# Patient Record
Sex: FEMALE | Race: WHITE | Employment: UNEMPLOYED | URBAN - METROPOLITAN AREA
[De-identification: names, ages, dates, MRNs, and addresses within clinical notes are randomized per-mention and may not be internally consistent; named-entity substitution may affect disease eponyms.]

---

## 2019-04-30 VITALS
DIASTOLIC BLOOD PRESSURE: 70 MMHG | SYSTOLIC BLOOD PRESSURE: 128 MMHG | TEMPERATURE: 96.5 F | WEIGHT: 131 LBS | BODY MASS INDEX: 24.73 KG/M2 | OXYGEN SATURATION: 97 % | HEART RATE: 88 BPM | HEIGHT: 61 IN

## 2019-04-30 RX ORDER — VALACYCLOVIR HYDROCHLORIDE 500 MG/1
500 TABLET, FILM COATED ORAL DAILY
COMMUNITY
End: 2019-08-08 | Stop reason: SDUPTHER

## 2019-04-30 RX ORDER — CYCLOBENZAPRINE HCL 5 MG
5 TABLET ORAL 2 TIMES DAILY PRN
COMMUNITY
End: 2019-05-09

## 2019-04-30 RX ORDER — DULOXETIN HYDROCHLORIDE 60 MG/1
60 CAPSULE, DELAYED RELEASE ORAL DAILY
COMMUNITY
End: 2019-08-09 | Stop reason: SDUPTHER

## 2019-04-30 RX ORDER — BUPRENORPHINE AND NALOXONE 8; 2 MG/1; MG/1
1 FILM, SOLUBLE BUCCAL; SUBLINGUAL DAILY
COMMUNITY

## 2019-05-09 ENCOUNTER — OFFICE VISIT (OUTPATIENT)
Dept: PRIMARY CARE CLINIC | Age: 64
End: 2019-05-09
Payer: COMMERCIAL

## 2019-05-09 VITALS
HEART RATE: 78 BPM | SYSTOLIC BLOOD PRESSURE: 110 MMHG | OXYGEN SATURATION: 99 % | TEMPERATURE: 98.8 F | RESPIRATION RATE: 16 BRPM | BODY MASS INDEX: 24.92 KG/M2 | HEIGHT: 61 IN | WEIGHT: 132 LBS | DIASTOLIC BLOOD PRESSURE: 60 MMHG

## 2019-05-09 DIAGNOSIS — M54.2 CERVICALGIA: ICD-10-CM

## 2019-05-09 DIAGNOSIS — Z13.220 LIPID SCREENING: ICD-10-CM

## 2019-05-09 DIAGNOSIS — F34.1 DYSTHYMIA: ICD-10-CM

## 2019-05-09 DIAGNOSIS — Z79.891 LONG TERM (CURRENT) USE OF OPIATE ANALGESIC: ICD-10-CM

## 2019-05-09 DIAGNOSIS — J00 ACUTE NASOPHARYNGITIS: ICD-10-CM

## 2019-05-09 DIAGNOSIS — Z12.11 SCREENING FOR COLON CANCER: Primary | ICD-10-CM

## 2019-05-09 DIAGNOSIS — B00.52 HERPES KERATITIS: ICD-10-CM

## 2019-05-09 PROCEDURE — G8427 DOCREV CUR MEDS BY ELIG CLIN: HCPCS | Performed by: FAMILY MEDICINE

## 2019-05-09 PROCEDURE — 99214 OFFICE O/P EST MOD 30 MIN: CPT | Performed by: FAMILY MEDICINE

## 2019-05-09 PROCEDURE — 3017F COLORECTAL CA SCREEN DOC REV: CPT | Performed by: FAMILY MEDICINE

## 2019-05-09 PROCEDURE — G8420 CALC BMI NORM PARAMETERS: HCPCS | Performed by: FAMILY MEDICINE

## 2019-05-09 PROCEDURE — 1036F TOBACCO NON-USER: CPT | Performed by: FAMILY MEDICINE

## 2019-05-09 SDOH — HEALTH STABILITY: MENTAL HEALTH: HOW OFTEN DO YOU HAVE A DRINK CONTAINING ALCOHOL?: MONTHLY OR LESS

## 2019-05-09 ASSESSMENT — ENCOUNTER SYMPTOMS
VOMITING: 0
DIARRHEA: 0
ABDOMINAL PAIN: 0
WHEEZING: 0
CONSTIPATION: 0
NAUSEA: 0
SORE THROAT: 1
SHORTNESS OF BREATH: 0
RHINORRHEA: 1

## 2019-05-09 NOTE — PROGRESS NOTES
2019     IRAM Pascual (:  1955) is a 66 y.o. female, here for evaluation of the following medical concerns:    Neck Pain: Patient presents today for follow-up of her cervicalgia. Patient was seen in regards this issue approximately one month ago. Patient previously had soreness in her neck with radiation bilaterally into her shoulders. She also had some numbness and tingling in her hands. Since patient's last appointment she was referred to physical therapy and a chiropractor. Patient reports that she has been doing better. Herpes Keratitis:  Patient recently ran out of her valacyclovir 500 mg prophylaxis regards to this issue. Patient was set up to see a ophthalmologist to follow her for this issue which she has not yet done. 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 URI: Patient reports that roughly 3-4 days ago she started to develop rhinorrhea, congestion, cough, sore throat. This is continued to progress. Patient now reports laryngitis with loss of her voice. Patient denies any fever, chills, nausea, vomiting, chest pain, shortness of breath. HM:   OB/GYN: PAP smear performed. Needs to complete mammogram.   Colonoscopy: FIT    Review of Systems   Constitutional: Negative for chills and fever. HENT: Positive for congestion, rhinorrhea and sore throat. Respiratory: Negative for shortness of breath and wheezing. Cardiovascular: Negative for chest pain and leg swelling. Gastrointestinal: Negative for abdominal pain, constipation, diarrhea, nausea and vomiting. Skin: Negative for rash. Neurological: Negative for light-headedness and headaches. Past Medical History:   Diagnosis Date    Depression     ETOH abuse     Herpes keratitis     Long term (current) use of opiate analgesic     Thyroid disease        Prior to Visit Medications    Medication Sig Taking?  Authorizing Provider   valACYclovir (VALTREX) 500 MG tablet Take 500 mg by mouth daily Yes Historical Provider, MD   buprenorphine-naloxone (SUBOXONE) 8-2 MG FILM SL film Place 1 Film under the tongue daily. 1 and 1/2 daily Yes Historical Provider, MD   DULoxetine (CYMBALTA) 60 MG extended release capsule Take 60 mg by mouth daily take 2 capsules daily Yes Historical Provider, MD        Allergies   Allergen Reactions    No Known Allergies        Social History     Tobacco Use    Smoking status: Former Smoker     Last attempt to quit: 2000     Years since quittin.0    Smokeless tobacco: Never Used   Substance Use Topics    Alcohol use: Yes     Frequency: Monthly or less     Comment: drinks grain alcohol daily           Vitals:    19 1114   BP: 110/60   Pulse: 78   Resp: 16   Temp: 98.8 °F (37.1 °C)   SpO2: 99%   Weight: 132 lb (59.9 kg)   Height: 5' 1\" (1.549 m)     Estimated body mass index is 24.94 kg/m² as calculated from the following:    Height as of this encounter: 5' 1\" (1.549 m). Weight as of this encounter: 132 lb (59.9 kg). Physical Exam   Constitutional: She appears well-developed. HENT:   Head: Normocephalic. Right Ear: Tympanic membrane normal.   Left Ear: Tympanic membrane normal.   Nose: Mucosal edema and rhinorrhea present. Mouth/Throat: Posterior oropharyngeal erythema present. No oropharyngeal exudate or posterior oropharyngeal edema. Eyes: Pupils are equal, round, and reactive to light. Conjunctivae are normal.   Cardiovascular: Normal rate, regular rhythm and normal heart sounds. No murmur heard. Pulmonary/Chest: Effort normal and breath sounds normal. She has no wheezes. She has no rales. Abdominal: Soft. Bowel sounds are normal. There is no tenderness. Musculoskeletal:        Cervical back: She exhibits normal range of motion, no tenderness and no bony tenderness. No sig. Pain on palpation of the patient's paraspinal musculature of her cervical spine. Lymphadenopathy:     She has no cervical adenopathy.

## 2019-05-30 ENCOUNTER — TELEPHONE (OUTPATIENT)
Dept: PRIMARY CARE CLINIC | Age: 64
End: 2019-05-30

## 2019-05-30 ENCOUNTER — HOSPITAL ENCOUNTER (OUTPATIENT)
Age: 64
Discharge: HOME | End: 2019-06-01
Payer: COMMERCIAL

## 2019-05-30 DIAGNOSIS — Z13.220 LIPID SCREENING: ICD-10-CM

## 2019-05-30 LAB
CHOLESTEROL, TOTAL: 254 MG/DL (ref 0–199)
HDLC SERPL-MCNC: 56 MG/DL
LDL CHOLESTEROL CALCULATED: 149 MG/DL (ref 0–99)
TRIGL SERPL-MCNC: 243 MG/DL (ref 0–149)
VLDLC SERPL CALC-MCNC: 49 MG/DL

## 2019-05-30 PROCEDURE — 36415 COLL VENOUS BLD VENIPUNCTURE: CPT

## 2019-05-30 PROCEDURE — 80061 LIPID PANEL: CPT

## 2019-06-08 PROBLEM — Z13.220 LIPID SCREENING: Status: RESOLVED | Noted: 2019-05-09 | Resolved: 2019-06-08

## 2019-07-26 DIAGNOSIS — Z12.11 SCREENING FOR COLON CANCER: ICD-10-CM

## 2019-07-26 LAB
CONTROL: NORMAL
HEMOCCULT STL QL: NEGATIVE

## 2019-07-26 PROCEDURE — 82274 ASSAY TEST FOR BLOOD FECAL: CPT | Performed by: FAMILY MEDICINE

## 2019-08-08 RX ORDER — VALACYCLOVIR HYDROCHLORIDE 500 MG/1
500 TABLET, FILM COATED ORAL DAILY
Qty: 90 TABLET | Refills: 0 | Status: SHIPPED | OUTPATIENT
Start: 2019-08-08 | End: 2019-10-28 | Stop reason: SDUPTHER

## 2019-08-08 RX ORDER — DULOXETIN HYDROCHLORIDE 60 MG/1
60 CAPSULE, DELAYED RELEASE ORAL DAILY
Qty: 90 CAPSULE | Refills: 0 | OUTPATIENT
Start: 2019-08-08

## 2019-08-12 ENCOUNTER — TELEPHONE (OUTPATIENT)
Dept: PRIMARY CARE CLINIC | Age: 64
End: 2019-08-12

## 2019-08-12 RX ORDER — DULOXETIN HYDROCHLORIDE 60 MG/1
60 CAPSULE, DELAYED RELEASE ORAL DAILY
Qty: 30 CAPSULE | Refills: 3 | Status: SHIPPED | OUTPATIENT
Start: 2019-08-12 | End: 2019-11-14 | Stop reason: SDUPTHER

## 2019-08-12 NOTE — TELEPHONE ENCOUNTER
Patient called in very upset that we did not send in her Cymbalta when it was first requested I explained to patient that is was denied due to the fact that her Phycologist usually proscribes that medication for her. Per PT she states that during her last office visit with Dr. Serjio Celestin he agreed to start proscribing medication for her. PT recalled in on 08/09/2019 for RX refill. Per PT her Pharmacy [facility] gave her a emergency script  on 08/10/2019 so she would not be completely out of her medication we sent her medication refill in today 08/12/2019. Patient is no longer feeling ill. Called Pharmacy verified Cymbalta 60mg one PO bid.

## 2019-10-24 RX ORDER — CYCLOBENZAPRINE HCL 5 MG
5 TABLET ORAL
COMMUNITY
End: 2019-12-05 | Stop reason: ALTCHOICE

## 2019-10-28 RX ORDER — VALACYCLOVIR HYDROCHLORIDE 500 MG/1
500 TABLET, FILM COATED ORAL DAILY
Qty: 90 TABLET | Refills: 0 | Status: SHIPPED | OUTPATIENT
Start: 2019-10-28 | End: 2019-11-14 | Stop reason: SDUPTHER

## 2019-11-14 ENCOUNTER — OFFICE VISIT (OUTPATIENT)
Dept: PRIMARY CARE CLINIC | Age: 64
End: 2019-11-14
Payer: COMMERCIAL

## 2019-11-14 VITALS
DIASTOLIC BLOOD PRESSURE: 80 MMHG | RESPIRATION RATE: 16 BRPM | TEMPERATURE: 98.6 F | BODY MASS INDEX: 27 KG/M2 | OXYGEN SATURATION: 96 % | HEART RATE: 78 BPM | SYSTOLIC BLOOD PRESSURE: 120 MMHG | HEIGHT: 61 IN | WEIGHT: 143 LBS

## 2019-11-14 DIAGNOSIS — B00.52 HERPES KERATITIS: Primary | ICD-10-CM

## 2019-11-14 DIAGNOSIS — R73.03 PREDIABETES: ICD-10-CM

## 2019-11-14 DIAGNOSIS — E04.1 THYROID NODULE: ICD-10-CM

## 2019-11-14 DIAGNOSIS — F34.1 DYSTHYMIA: ICD-10-CM

## 2019-11-14 DIAGNOSIS — Z79.891 LONG TERM (CURRENT) USE OF OPIATE ANALGESIC: ICD-10-CM

## 2019-11-14 DIAGNOSIS — M54.2 CERVICALGIA: ICD-10-CM

## 2019-11-14 PROCEDURE — 3017F COLORECTAL CA SCREEN DOC REV: CPT | Performed by: FAMILY MEDICINE

## 2019-11-14 PROCEDURE — G8428 CUR MEDS NOT DOCUMENT: HCPCS | Performed by: FAMILY MEDICINE

## 2019-11-14 PROCEDURE — 1036F TOBACCO NON-USER: CPT | Performed by: FAMILY MEDICINE

## 2019-11-14 PROCEDURE — G8482 FLU IMMUNIZE ORDER/ADMIN: HCPCS | Performed by: FAMILY MEDICINE

## 2019-11-14 PROCEDURE — G8419 CALC BMI OUT NRM PARAM NOF/U: HCPCS | Performed by: FAMILY MEDICINE

## 2019-11-14 PROCEDURE — 99214 OFFICE O/P EST MOD 30 MIN: CPT | Performed by: FAMILY MEDICINE

## 2019-11-14 RX ORDER — VALACYCLOVIR HYDROCHLORIDE 500 MG/1
500 TABLET, FILM COATED ORAL DAILY
Qty: 90 TABLET | Refills: 2 | Status: SHIPPED
Start: 2019-11-14 | End: 2020-04-10 | Stop reason: SDUPTHER

## 2019-11-14 RX ORDER — DULOXETIN HYDROCHLORIDE 60 MG/1
60 CAPSULE, DELAYED RELEASE ORAL DAILY
Qty: 90 CAPSULE | Refills: 2 | Status: SHIPPED
Start: 2019-11-14 | End: 2020-04-10 | Stop reason: SDUPTHER

## 2019-11-14 RX ORDER — ALENDRONATE SODIUM 70 MG/1
70 TABLET ORAL
Refills: 0 | COMMUNITY
Start: 2019-11-10

## 2019-11-14 ASSESSMENT — ENCOUNTER SYMPTOMS
ABDOMINAL PAIN: 0
SORE THROAT: 0
SHORTNESS OF BREATH: 0
DIARRHEA: 0
NAUSEA: 0
RHINORRHEA: 0
WHEEZING: 0
VOMITING: 0
CONSTIPATION: 0

## 2019-11-19 ENCOUNTER — HOSPITAL ENCOUNTER (OUTPATIENT)
Age: 64
Discharge: HOME | End: 2019-11-21
Payer: COMMERCIAL

## 2019-11-19 DIAGNOSIS — F34.1 DYSTHYMIA: ICD-10-CM

## 2019-11-19 DIAGNOSIS — E04.1 THYROID NODULE: ICD-10-CM

## 2019-11-19 DIAGNOSIS — B00.52 HERPES KERATITIS: ICD-10-CM

## 2019-11-19 DIAGNOSIS — R73.03 PREDIABETES: ICD-10-CM

## 2019-11-19 PROCEDURE — 85027 COMPLETE CBC AUTOMATED: CPT

## 2019-11-19 PROCEDURE — 84443 ASSAY THYROID STIM HORMONE: CPT

## 2019-11-19 PROCEDURE — 80053 COMPREHEN METABOLIC PANEL: CPT

## 2019-11-19 PROCEDURE — 83036 HEMOGLOBIN GLYCOSYLATED A1C: CPT

## 2019-11-19 PROCEDURE — 36415 COLL VENOUS BLD VENIPUNCTURE: CPT

## 2019-11-20 LAB
ALBUMIN SERPL-MCNC: 4 G/DL (ref 3.5–5.2)
ALP BLD-CCNC: 58 U/L (ref 35–104)
ALT SERPL-CCNC: 12 U/L (ref 0–32)
ANION GAP SERPL CALCULATED.3IONS-SCNC: 12 MMOL/L (ref 7–16)
AST SERPL-CCNC: 16 U/L (ref 0–31)
BILIRUB SERPL-MCNC: 0.3 MG/DL (ref 0–1.2)
BUN BLDV-MCNC: 10 MG/DL (ref 8–23)
CALCIUM SERPL-MCNC: 9 MG/DL (ref 8.6–10.2)
CHLORIDE BLD-SCNC: 103 MMOL/L (ref 98–107)
CO2: 25 MMOL/L (ref 22–29)
CREAT SERPL-MCNC: 0.7 MG/DL (ref 0.5–1)
GFR AFRICAN AMERICAN: >60
GFR NON-AFRICAN AMERICAN: >60 ML/MIN/1.73
GLUCOSE BLD-MCNC: 89 MG/DL (ref 74–99)
HBA1C MFR BLD: 4.9 % (ref 4–5.6)
HCT VFR BLD CALC: 42.8 % (ref 34–48)
HEMOGLOBIN: 13.1 G/DL (ref 11.5–15.5)
MCH RBC QN AUTO: 31 PG (ref 26–35)
MCHC RBC AUTO-ENTMCNC: 30.6 % (ref 32–34.5)
MCV RBC AUTO: 101.2 FL (ref 80–99.9)
PDW BLD-RTO: 12.6 FL (ref 11.5–15)
PLATELET # BLD: 212 E9/L (ref 130–450)
PMV BLD AUTO: 9.4 FL (ref 7–12)
POTASSIUM SERPL-SCNC: 4.7 MMOL/L (ref 3.5–5)
RBC # BLD: 4.23 E12/L (ref 3.5–5.5)
SODIUM BLD-SCNC: 140 MMOL/L (ref 132–146)
TOTAL PROTEIN: 6.5 G/DL (ref 6.4–8.3)
TSH SERPL DL<=0.05 MIU/L-ACNC: 1.41 UIU/ML (ref 0.27–4.2)
WBC # BLD: 4.8 E9/L (ref 4.5–11.5)

## 2019-11-22 DIAGNOSIS — E04.1 LEFT THYROID NODULE: Primary | ICD-10-CM

## 2019-12-05 ENCOUNTER — OFFICE VISIT (OUTPATIENT)
Dept: FAMILY MEDICINE CLINIC | Age: 64
End: 2019-12-05
Payer: COMMERCIAL

## 2019-12-05 VITALS
BODY MASS INDEX: 27.75 KG/M2 | SYSTOLIC BLOOD PRESSURE: 120 MMHG | RESPIRATION RATE: 20 BRPM | WEIGHT: 147 LBS | HEIGHT: 61 IN | TEMPERATURE: 97.3 F | OXYGEN SATURATION: 99 % | DIASTOLIC BLOOD PRESSURE: 82 MMHG | HEART RATE: 91 BPM

## 2019-12-05 DIAGNOSIS — M54.2 CERVICALGIA: Primary | ICD-10-CM

## 2019-12-05 PROCEDURE — 96372 THER/PROPH/DIAG INJ SC/IM: CPT | Performed by: NURSE PRACTITIONER

## 2019-12-05 PROCEDURE — 99213 OFFICE O/P EST LOW 20 MIN: CPT | Performed by: NURSE PRACTITIONER

## 2019-12-05 RX ORDER — PREDNISONE 20 MG/1
TABLET ORAL
Qty: 18 TABLET | Refills: 0 | Status: SHIPPED | OUTPATIENT
Start: 2019-12-05 | End: 2019-12-14

## 2019-12-05 RX ORDER — METHYLPREDNISOLONE ACETATE 40 MG/ML
40 INJECTION, SUSPENSION INTRA-ARTICULAR; INTRALESIONAL; INTRAMUSCULAR; SOFT TISSUE ONCE
Status: COMPLETED | OUTPATIENT
Start: 2019-12-05 | End: 2019-12-05

## 2019-12-05 RX ADMIN — METHYLPREDNISOLONE ACETATE 40 MG: 40 INJECTION, SUSPENSION INTRA-ARTICULAR; INTRALESIONAL; INTRAMUSCULAR; SOFT TISSUE at 14:37

## 2019-12-05 ASSESSMENT — ENCOUNTER SYMPTOMS
CHEST TIGHTNESS: 0
VOMITING: 0
CONSTIPATION: 0
ABDOMINAL PAIN: 0
COUGH: 0
NAUSEA: 0
SHORTNESS OF BREATH: 0
WHEEZING: 0
DIARRHEA: 0

## 2019-12-10 ENCOUNTER — HOSPITAL ENCOUNTER (OUTPATIENT)
Dept: ULTRASOUND IMAGING | Age: 64
Discharge: HOME | End: 2019-12-12
Payer: COMMERCIAL

## 2019-12-10 ENCOUNTER — TELEPHONE (OUTPATIENT)
Dept: PRIMARY CARE CLINIC | Age: 64
End: 2019-12-10

## 2019-12-10 DIAGNOSIS — E04.1 LEFT THYROID NODULE: ICD-10-CM

## 2019-12-10 PROCEDURE — 88305 TISSUE EXAM BY PATHOLOGIST: CPT

## 2019-12-10 PROCEDURE — 10005 FNA BX W/US GDN 1ST LES: CPT

## 2019-12-10 PROCEDURE — 88173 CYTOPATH EVAL FNA REPORT: CPT

## 2019-12-10 PROCEDURE — 76942 ECHO GUIDE FOR BIOPSY: CPT

## 2019-12-10 PROCEDURE — 2500000003 HC RX 250 WO HCPCS: Performed by: RADIOLOGY

## 2019-12-10 RX ORDER — LIDOCAINE HYDROCHLORIDE 10 MG/ML
5 INJECTION, SOLUTION INFILTRATION; PERINEURAL ONCE
Status: COMPLETED | OUTPATIENT
Start: 2019-12-10 | End: 2019-12-10

## 2019-12-10 RX ADMIN — LIDOCAINE HYDROCHLORIDE 5 ML: 10 INJECTION, SOLUTION INFILTRATION; PERINEURAL at 09:07

## 2019-12-10 ASSESSMENT — PAIN SCALES - GENERAL: PAINLEVEL_OUTOF10: 0

## 2019-12-12 DIAGNOSIS — E04.1 LEFT THYROID NODULE: Primary | ICD-10-CM

## 2019-12-27 ENCOUNTER — EVALUATION (OUTPATIENT)
Dept: PHYSICAL THERAPY | Age: 64
End: 2019-12-27
Payer: COMMERCIAL

## 2019-12-27 DIAGNOSIS — M54.2 CERVICALGIA: Primary | ICD-10-CM

## 2019-12-27 PROCEDURE — 97110 THERAPEUTIC EXERCISES: CPT | Performed by: PHYSICAL THERAPIST

## 2019-12-27 PROCEDURE — 97161 PT EVAL LOW COMPLEX 20 MIN: CPT | Performed by: PHYSICAL THERAPIST

## 2020-01-03 ENCOUNTER — TREATMENT (OUTPATIENT)
Dept: PHYSICAL THERAPY | Age: 65
End: 2020-01-03
Payer: COMMERCIAL

## 2020-01-03 PROCEDURE — 97110 THERAPEUTIC EXERCISES: CPT | Performed by: PHYSICAL THERAPIST

## 2020-01-03 PROCEDURE — 97530 THERAPEUTIC ACTIVITIES: CPT | Performed by: PHYSICAL THERAPIST

## 2020-01-03 NOTE — PROGRESS NOTES
[address] [place] [facility]   Phone: [phone]   Fax: [phone]      Physical Therapy Daily Treatment Note    Date: 1/3/2020  Patient Name: Maya Xiong  : 1955   MRN: [id number]  DOInjury: N/A  DOSx: N/A   Referring Provider: Salvador Savage, APRN - CNP  [address] [facility], [address] [place] [address]     Medical Diagnosis:   M54.2 (ICD-10-CM) - Cervicalgia    Outcome Measure:  NDI     S: Pt reports 1/10 pain at rest and 5/10 bicep area with activity. O:  Time [id number]     Visit  Repeat outcome measure at mid point and end. Pain 1- 5/10      ROM      Modalities      MH  5 minutes  Neck/upper trap area NC   Ice            Manual                  Stretch      Upper trap stretch  5 x 10s   TE   Levator scapulae stretch 5 x 10s   TE   Cervical rotation 10 x 3s   TE         Exercise      UBE  or      Bike      ROWS: H 2 x 10 x 5s hold OTB  TA   ROWS: M 1 x 10 x 5s hold OTB TA   ROWS: L      Shoulder ER with scap retraction      Cross country ski      Shrugs      Shoulder Press with wand      Cervical isometrics            Shoulder flexion supine with wand 20 x 5s   TE   Shoulder chest press with wand 2 x 10  TE                           Shoulder extension 2 x 10  OTB  TE         A:  Tolerated well. Pt reports compliance with HEP and reports she feels they are helping her at home. Pt's neck stretches completed 2 x through during session. End of session, pt reports feels good.     P: Continue with rehab plan  Michael Messina, PT DPT, PT [id number]   Treatment Charges: Mins Units   Initial Evaluation     Re-Evaluation     Ther Exercise         TE 34 2   Manual Therapy     MT     Ther Activities        TA 15 1   Gait Training          GT     Neuro Re-education NR     Modalities     Non-Billable Service Time 5    Other     Total Time/Units 54 3

## 2020-01-06 ENCOUNTER — TREATMENT (OUTPATIENT)
Dept: PHYSICAL THERAPY | Age: 65
End: 2020-01-06
Payer: COMMERCIAL

## 2020-01-06 PROCEDURE — 97110 THERAPEUTIC EXERCISES: CPT | Performed by: PHYSICAL THERAPIST

## 2020-01-06 PROCEDURE — 97150 GROUP THERAPEUTIC PROCEDURES: CPT | Performed by: PHYSICAL THERAPIST

## 2020-01-06 NOTE — PROGRESS NOTES
[address] [facility]   Phone: [phone]   Fax: [phone]      Physical Therapy Daily Treatment Note    Date: 2020  Patient Name: Chani Serra  : 1955   MRN: [id number]  DOInjury: N/A  DOSx: N/A   Referring Provider: Kylah Young, APRN - CNP  [address] [facility] [address] [facility], [address] [facility]     Medical Diagnosis:   M54.2 (ICD-10-CM) - Cervicalgia    Outcome Measure:  NDI     S: Pt reports 1/10 pain. Pt reports upset due to dog passed away recently. O:  Time [id number]     Visit 3/12 Repeat outcome measure at mid point and end. Pain 1/10 neck and arm      ROM      Modalities      MH  5 minutes  Neck/upper trap area NC   Ice            Manual                  Stretch      Upper trap stretch  5 x 10s   TE   Levator scapulae stretch 5 x 10s   TE   Cervical rotation 10 x 3s   TE         Exercise      UBE  or      Bike      ROWS: H 2 x 10 x 5s hold OTB  TA   ROWS: M 1 x 10 x 5s hold OTB TA   ROWS: L                  IR 2 x 10 OTB    ER 2 x 10  OTB    Cross country ski      Shrugs      Shoulder Press with wand      Cervical isometrics            Shoulder flexion supine with wand 20 x 5s   TE   Shoulder chest press with wand 2 x 10  TE   sidelying shoulder abd 2 x 10  Chicken wing                      Shoulder extension 2 x 10  OTB  TE         A:  Tolerated well. Pt reports inconsistent with HEP recently due to dog passing away. Pt reports will be more compliant going forward. Pt reports min increase in pain with activity but no change in pain end of session. Will progress as tolerated.    P: Continue with rehab plan  Karson Jackson, PT DPT, PT [id number]   Treatment Charges: Mins Units   Initial Evaluation     Re-Evaluation     Ther Exercise         TE 15 1   Manual Therapy     MT     Ther Activities        TA     Gait Training          GT     Neuro Re-education NR     Modalities     Non-Billable Service Time 5    Other 20 1   Total Time/Units 40 2

## 2020-01-07 ENCOUNTER — HOSPITAL ENCOUNTER (OUTPATIENT)
Dept: ULTRASOUND IMAGING | Age: 65
Discharge: HOME | End: 2020-01-09
Payer: COMMERCIAL

## 2020-01-07 VITALS
SYSTOLIC BLOOD PRESSURE: 137 MMHG | DIASTOLIC BLOOD PRESSURE: 63 MMHG | BODY MASS INDEX: 27 KG/M2 | HEART RATE: 73 BPM | WEIGHT: 143 LBS | HEIGHT: 61 IN | RESPIRATION RATE: 16 BRPM | OXYGEN SATURATION: 96 %

## 2020-01-07 PROCEDURE — 10005 FNA BX W/US GDN 1ST LES: CPT

## 2020-01-07 PROCEDURE — 88173 CYTOPATH EVAL FNA REPORT: CPT

## 2020-01-07 PROCEDURE — 88305 TISSUE EXAM BY PATHOLOGIST: CPT

## 2020-01-07 RX ORDER — LIDOCAINE HYDROCHLORIDE 10 MG/ML
5 INJECTION, SOLUTION INFILTRATION; PERINEURAL ONCE
Status: DISCONTINUED | OUTPATIENT
Start: 2020-01-07 | End: 2020-01-10 | Stop reason: HOSPADM

## 2020-01-07 NOTE — BRIEF OP NOTE
Interventional Radiology Brief Postoperative Note    Maya Xiong  YOB: 1955  [id number]    Pre-operative Diagnosis: Left thyroid nodule    Post-operative Diagnosis: Left thyroid nodule    Procedure: Left thyroid nodule FNA biopsy    Drains: None    Anesthesia: Local    Surgeons/Assistants: Soumya Cox    Estimated Blood Loss (mL): 1    Complications: None    Specimens: FNA    Findings: See PACS.      Electronically signed by Taty Palm MD on 1/7/2020 at 10:00 AM

## 2020-01-13 ENCOUNTER — TREATMENT (OUTPATIENT)
Dept: PHYSICAL THERAPY | Age: 65
End: 2020-01-13
Payer: COMMERCIAL

## 2020-01-13 PROCEDURE — 97110 THERAPEUTIC EXERCISES: CPT | Performed by: PHYSICAL THERAPIST

## 2020-01-13 PROCEDURE — 97150 GROUP THERAPEUTIC PROCEDURES: CPT | Performed by: PHYSICAL THERAPIST

## 2020-01-13 NOTE — PROGRESS NOTES
[address] [facility]   Phone: [phone]   Fax: [phone]      Physical Therapy Daily Treatment Note    Date: 2020  Patient Name: Huey Dominguez  : 1955   MRN: [id number]  DOInjury: N/A  DOSx: N/A   Referring Provider: Yahaira Contreras, APRN - CNP  [address], [address]     Medical Diagnosis:   M54.2 (ICD-10-CM) - Cervicalgia    Outcome Measure:  NDI     S: Pt reports no pain just a little achey R arm. Tarah Shell:  Time 1005- 1102     Visit  Repeat outcome measure at mid point and end. Pain . 5/10 R arm ache     ROM      Modalities      MH  5 minutes  Neck/upper trap area NC   Ice            Manual                  Stretch      Upper trap stretch  5 x 10s   TE   Levator scapulae stretch 5 x 10s   TE   Cervical rotation 10 x 3s   TE               Sleeper stretch into IR 3 x 30s     IR towel stretch 3 x 30s     Posterior capsule stretch 3 x 30 s     Exercise      UBE  or      Bike      ROWS: H 2 x 10 x 5s hold OTB  TA   ROWS: M 1 x 10 x 5s hold OTB TA   ROWS: L                  IR 2 x 10 OTB TE   ER 2 x 10  OTB TE   Cross country ski      Shrugs      Shoulder Press with wand      Cervical isometrics      Wand shoulder abd  20 x 5s  TE   Shoulder flexion supine with wand 20 x 5s   TE   Shoulder chest press with wand 3 x 10  TE   sidelying shoulder abd  Chicken wing    Bicep curl  2 x 10  3#, 3 pos TE         Standing shoulder flexion to 90* X 10  OTB  TE   Shoulder extension  OTB  TE         A:  Tolerated well. Pt reports no pain end of session, just mm fatigue. Will progress as tolerated.    P: Continue with rehab plan  Ekaterina Goodwin, PT DPT, PT [id number]   Treatment Charges: Mins Units   Initial Evaluation     Re-Evaluation     Ther Exercise         TE 37 2   Manual Therapy     MT     Ther Activities        TA     Gait Training          GT     Neuro Re-education NR     Modalities     Non-Billable Service Time     Other group  20 1   Total

## 2020-01-16 ENCOUNTER — TREATMENT (OUTPATIENT)
Dept: PHYSICAL THERAPY | Age: 65
End: 2020-01-16
Payer: COMMERCIAL

## 2020-01-16 PROCEDURE — 97150 GROUP THERAPEUTIC PROCEDURES: CPT | Performed by: PHYSICAL THERAPIST

## 2020-01-16 PROCEDURE — 97110 THERAPEUTIC EXERCISES: CPT | Performed by: PHYSICAL THERAPIST
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
## 2020-01-21 ENCOUNTER — TREATMENT (OUTPATIENT)
Dept: PHYSICAL THERAPY | Age: 65
End: 2020-01-21
Payer: COMMERCIAL

## 2020-01-21 PROCEDURE — 97110 THERAPEUTIC EXERCISES: CPT | Performed by: PHYSICAL THERAPIST

## 2020-01-21 NOTE — PROGRESS NOTES
[address] [facility]   Phone: [phone]   Fax: [phone]      Physical Therapy Daily Treatment Note    Date: 2020  Patient Name: Noel Mendez  : 1955   MRN: [id number]  DOInjury: N/A  DOSx: N/A   Referring Provider: No referring provider defined for this encounter. Medical Diagnosis:   M54.2 (ICD-10-CM) - Cervicalgia    Outcome Measure:  NDI     S: Pt reports . 5 pain. Pt reports and demonstrates that she is now able to get her hand behind her back with minimal pain. Pt reports very happy with progress. O:  Time [id number]     Visit  Repeat outcome measure at mid point and end. Pain . 5/10     ROM      Modalities      MH  NC   Ice            Manual                  Stretch      Upper trap stretch  5 x 10s   TE   Levator scapulae stretch 5 x 10s   TE   Cervical rotation 10 x 3s   TE               Sleeper stretch into IR 3 x 30s     IR towel stretch 3 x 30s     Posterior capsule stretch 3 x 30 s     Exercise      UBE  or      Bike      ROWS: H 2 x 10 x 5s hold OTB  TA   ROWS: M 2 x 10 x 5s hold OTB TA   ROWS: L                  IR 2 x 10 OTB TE   ER 2 x 10  OTB TE   Cross country ski      Shrugs      Shoulder Press with wand      Cervical isometrics      Wand shoulder abd  20 x 5s  TE   Shoulder flexion supine with wand 20 x 5s   TE   Shoulder chest press with wand 3 x 10  TE   sidelying shoulder abd 2 x 10      Bicep curl  2 x 10  2#, 3 pos TE         Standing shoulder flexion to 90* X 20  AROM  TE   Shoulder extension 2 x 10  OTB  TE         A:  Tolerated well. Pt reports 2-3/10 pain end of session from standing shoulder flexion. Will continue to monitor. Will progress as tolerated.    P: Continue with rehab plan  Racheal Gardner, PT DPT, PT [id number]   Treatment Charges: Mins Units   Initial Evaluation     Re-Evaluation     Ther Exercise         TE 44 3   Manual Therapy     MT     Ther Activities        TA 5 0   Gait Training          GT     Neuro

## 2020-01-23 ENCOUNTER — TREATMENT (OUTPATIENT)
Dept: PHYSICAL THERAPY | Age: 65
End: 2020-01-23
Payer: COMMERCIAL

## 2020-01-23 PROCEDURE — 97150 GROUP THERAPEUTIC PROCEDURES: CPT | Performed by: PHYSICAL THERAPIST

## 2020-01-23 PROCEDURE — 97110 THERAPEUTIC EXERCISES: CPT | Performed by: PHYSICAL THERAPIST

## 2020-01-23 NOTE — PROGRESS NOTES
Neuro Re-education NR     Modalities     Non-Billable Service Time     Other group  25 1   Total Time/Units 51 3

## 2020-01-27 ENCOUNTER — TREATMENT (OUTPATIENT)
Dept: PHYSICAL THERAPY | Age: 65
End: 2020-01-27
Payer: COMMERCIAL

## 2020-01-27 PROCEDURE — 97110 THERAPEUTIC EXERCISES: CPT | Performed by: PHYSICAL THERAPIST

## 2020-01-27 PROCEDURE — 97150 GROUP THERAPEUTIC PROCEDURES: CPT | Performed by: PHYSICAL THERAPIST

## 2020-01-27 NOTE — PROGRESS NOTES
[address] [facility]   Phone: [phone]   Fax: [phone]      Physical Therapy Daily Treatment Note    Date: 2020  Patient Name: Colleen Beckman  : 1955   MRN: [id number]  DOInjury: N/A  DOSx: N/A   Referring Provider: ABIEL Meza - CNP  [address] [facility] [address] [place], [address]     Medical Diagnosis:   M54.2 (ICD-10-CM) - Cervicalgia    Outcome Measure:  NDI     S: Pt reports 1/10 pain. Pt reports moved arm funny this weekend and had some pain but feeling better today. O:  Time 5287-1496     Visit  Repeat outcome measure at mid point and end. Pain 1/10     ROM      Modalities      MH  NC   Ice            Manual                  Stretch      Upper trap stretch  5 x 10s   TE   Levator scapulae stretch 5 x 10s   TE   Cervical rotation 10 x 3s   TE               Sleeper stretch into IR 3 x 30s     IR towel stretch 3 x 30s     Posterior capsule stretch 3 x 30 s     Exercise      UBE  or      Bike      ROWS: H 2 x 10 x 5s hold yTB  TA   ROWS: M 2 x 10 x 5s hold YTB TA   ROWS: L                  IR 2 x 10 YTB TE   ER 2 x 10  OTB TE   Cross country ski      Alphabet with ball on wall  X 1     Shoulder Press with wand      Cervical isometrics      Wand shoulder abd  20 x 5s  TE   Shoulder flexion supine with wand 20 x 5s   TE   Shoulder chest press with wand 3 x 10  TE   sidelying shoulder abd 2 x 10      Bicep curl  2 x 10  2#, 3 pos TE         Standing shoulder flexion and scaption  X 20  AROM and with OTB  TE   Shoulder extension 2 x 10  OTB  TE         A:  Tolerated well. Standing shoulder flexion and scaption OTB added as pt demonstrates increased ability with AROM. Pt with good tolerance to added TB.     P: Continue with rehab plan. Plan to advance exercises as tolerated next session.   Laury No, PT DPT, [address] [facility] [id number]   Treatment Charges: Mins Units   Initial Evaluation     Re-Evaluation     Ther Exercise         TE 27 2   Manual Therapy MT     Ther Activities        TA     Gait Training          GT     Neuro Re-education NR     Modalities     Non-Billable Service Time     Other group  27 1   Total Time/Units 54 3

## 2020-01-30 ENCOUNTER — TREATMENT (OUTPATIENT)
Dept: PHYSICAL THERAPY | Age: 65
End: 2020-01-30
Payer: COMMERCIAL

## 2020-01-30 PROCEDURE — 97110 THERAPEUTIC EXERCISES: CPT | Performed by: PHYSICAL THERAPIST

## 2020-01-30 NOTE — PROGRESS NOTES
Ther Exercise         TE 23 2   Manual Therapy     MT     Ther Activities        TA     Gait Training          GT     Neuro Re-education NR     Modalities     Non-Billable Service Time 18    Other group      Total Time/Units 41 2

## 2020-02-04 ENCOUNTER — TREATMENT (OUTPATIENT)
Dept: PHYSICAL THERAPY | Age: 65
End: 2020-02-04
Payer: COMMERCIAL

## 2020-02-04 PROCEDURE — 97530 THERAPEUTIC ACTIVITIES: CPT | Performed by: PHYSICAL THERAPIST

## 2020-02-04 PROCEDURE — 97110 THERAPEUTIC EXERCISES: CPT | Performed by: PHYSICAL THERAPIST

## 2020-02-04 NOTE — PROGRESS NOTES
[address] [facility]   Phone: [phone]   Fax: [phone]      Physical Therapy Daily Treatment Note    Date: 2020  Patient Name: Stephany Victor  : 1955   MRN: [id number]  DOInjury: N/A  DOSx: N/A   Referring Provider: Elan Singh, APRN - CNP  [address] [facility]  [address]  [place], [address]      Medical Diagnosis:   M54.2 (ICD-10-CM) - Cervicalgia    Outcome Measure:  NDI     S: Pt reports . 5/10 pain. Pt reports it's more a tightness than anything. Pt reports washing and combing her hair is getting easier along with taking coat off and on. Pt notices putting fitted sheet on is difficult still. O:  Time 1010- 1054     Visit 10/12 Repeat outcome measure at mid point and end. Pain . 5/10     ROM      Modalities        NC   Ice            Manual                  Stretch      Upper trap stretch  5 x 10s   TE   Levator scapulae stretch 5 x 10s   TE   Cervical rotation 10 x 3s   TE               Sleeper stretch into IR 3 x 30s     IR towel stretch 3 x 30s     Posterior capsule stretch 3 x 30 s     Exercise      UBE  or      Bike      ROWS: H 3 x 10 x 5s hold yTB  TA   ROWS: M 3 x 10 x 5s hold YTB TA   ROWS: L                  IR 3 x 10 YTB TE   ER 3 x 10  YTB TE   Cross country ski      Alphabet with ball on wall  X 2     Shoulder Press with wand      Wall push ups with SB 2 x 10     Shoulder flexion stretch on wall with SB 10 x 10s TE   Shoulder flexion supine with wand  TE   Shoulder chest press with wand   TE   sidelying shoulder abd      Bicep curl  2 x 10  3#, 3 pos TE   PNF  2 x 10  All 4 patterns, YTB    Standing shoulder flexion and scaption  X 20   YTB  TE   Shoulder extension 3 x 10  YTB  TE         A:  Tolerated well. Weights , theraband and reps advanced today with good tolerances. PNF patterns also added today. Pt reports 1/10 pain end of session. P: Continue with rehab plan.    Sneha Live, PT [facility] [place] [id number]   Treatment Charges: Mins Units

## 2020-02-06 ENCOUNTER — TREATMENT (OUTPATIENT)
Dept: PHYSICAL THERAPY | Age: 65
End: 2020-02-06
Payer: COMMERCIAL

## 2020-02-06 PROCEDURE — 97530 THERAPEUTIC ACTIVITIES: CPT | Performed by: PHYSICAL THERAPIST

## 2020-02-06 PROCEDURE — 97110 THERAPEUTIC EXERCISES: CPT | Performed by: PHYSICAL THERAPIST

## 2020-02-06 NOTE — PROGRESS NOTES
[address] [facility]   Phone: [phone]             Fax: [phone]        Physical Therapy Daily Treatment Note     Date: 2020  Patient Name: Neema Andersen  : 1955            MRN: [id number]  DOInjury: N/A  DOSx: N/A   Referring Provider: Bobie Oppenheim, APRN - CNP  [address] [facility] [address]  [place], [facility] [address] [facility]                                 Medical Diagnosis:   M54.2 (ICD-10-CM) - Cervicalgia     Outcome Measure:  NDI      S: Pt reports difficulty sleeping last night but not due to pain. Pt reports . 5/10 pain. O:  Time 1006- 1059       Visit  Repeat outcome measure at mid point and end.     Pain . 5/10       ROM         Modalities         MH    NC   Ice                   Manual                             Stretch         Upper trap stretch  5 x 10s    TE   Levator scapulae stretch 5 x 10s    TE   Cervical rotation 10 x 3s    TE                       Sleeper stretch into IR 3 x 30s       IR towel stretch 3 x 30s       Posterior capsule stretch 3 x 30 s       Exercise         UBE  or         Bike         ROWS: H 3 x 10 x 5s hold yTB  TA   ROWS: M 3 x 10 x 5s hold YTB TA   ROWS: L                             IR 3 x 10 YTB TE   ER 3 x 10  YTB TE   Cross country ski         Alphabet with ball on wall  X 2       Shoulder Press with wand         Wall push ups with SB 2 x 10       Shoulder flexion stretch on wall with SB 10 x 10s   TE   Shoulder flexion supine with wand    TE   Shoulder chest press with wand    TE   sidelying shoulder abd        Bicep curl  3 x 10  4#, 3 pos TE   PNF  2 x 10  All 4 patterns, YTB     Standing shoulder flexion and scaption  X 20   YTB  TE   Shoulder extension 3 x 10  YTB  TE    tower taps   3 x 10   3#      A:  Tolerated well. [place]rum taps added today. Pt reports no problem with. Pt reports easy to put jacket on now (no shoulder pain with ). Still has difficulty putting purse on seat next to her when sitting in car.  PNF exercises to simulate functional movements such as this. P: Continue with rehab plan.    Shaun Abdullahi, PT DPT, [address] [facility] [id number]   Treatment Charges: Mins Units   Initial Evaluation       Re-Evaluation       Ther Exercise         TE 40 3   Manual Therapy     MT       Ther Activities        TA 13 1   Gait Training          GT       Neuro Re-education NR       Modalities       Non-Billable Service Time       Other group        Total Time/Units 53 4

## 2020-02-11 ENCOUNTER — TREATMENT (OUTPATIENT)
Dept: PHYSICAL THERAPY | Age: 65
End: 2020-02-11
Payer: COMMERCIAL

## 2020-02-11 PROCEDURE — 97164 PT RE-EVAL EST PLAN CARE: CPT | Performed by: PHYSICAL THERAPIST

## 2020-02-11 NOTE — PROGRESS NOTES
[facility]   Phone: [phone]             Fax: [phone]        Physical Therapy Daily Treatment Note     Date: 2/11/2020  Patient Aleshia WYNNEL: [birth year]            YTV: [id number]  DOInjury: N/A  DOSx: N/A   Referring Provider: Alexi Serrano, APRN - CNP  [address]  [address] [place], [address]  Medical Diagnosis:   M54.2 (ICD-10-CM) - Cervicalgia     Outcome Measure:  Quickdash 14/55     S: Pt reports helped family move yesterday and 1.5-2/10 pain. O:  Time 1005- 1040       Visit 12/12 Repeat outcome measure at mid point and end.     Pain 1.5-2/10       ROM         Modalities         MH    NC   Ice                   Manual                             Stretch         Upper trap stretch  5 x 10s    TE   Levator scapulae stretch 5 x 10s    TE   Cervical rotation 10 x 3s    TE                       Sleeper stretch into IR 3 x 30s       IR towel stretch 3 x 30s       Posterior capsule stretch 3 x 30 s       Exercise         UBE  or         Bike         ROWS: H 3 x 10 x 5s hold yTB  TA   ROWS: M 3 x 10 x 5s hold YTB TA   ROWS: L                       IR YTB TE   ER YTB TE   Cross country ski         Alphabet with ball on wall  X 2       Shoulder Press with wand         Wall push ups with SB 2 x 10       Shoulder flexion stretch on wall with SB 10 x 10s   TE   Shoulder flexion supine with wand    TE   Shoulder chest press with wand    TE   sidelying shoulder abd        Bicep curl  3 x 10  4#, 3 pos TE   PNF   All 4 patterns, YTB     Standing shoulder flexion and scaption  X 20   YTB  TE   Shoulder extension 3 x 10  YTB  TE    tower taps    3#      A:  Tolerated well.  Pt's reassessment completed today. See note for details.   Pt reports she feels that she is 75-80% back to normal.  Pt reports she feels comfortable with the exercises done in therapy session to complete as continued HEP and reports does not need a handout for any

## 2020-02-11 NOTE — DISCHARGE SUMMARY
shoulder     Sensation: intact to light touch and temperature.     Special Tests:   []? Nerve Root Compression           Right []?+ / []? -    Left []?+ / []? -  []? Cervical Distraction []?+ / []? -     []? Spurling's Test           Right []?+ / [x]? -    Left []?+ / [x]? -     []? Flexion/rotation test :           Right []?+ / [x]? -    Left []?+ / [x]? -    []? Other: []?+ / []? -       Special Tests/Functional Screens:    []? Abhay []?+ / [x]? -  []? Laurens's []?+ / []? -   []? DANISH Ballard []?+ / []? -    []? [address] drawer []?+ / []? -    []? Bicep Load []?+ / []? -   []? Crank []?+ / [x]? -  []? Anice Cure []?+ / []? -   []? Mosie Loser []?+ / []? -  []? Neer's []?+ / []? -      []? Speed's []?+ / []? -   []? Kerwin's []?+ / [x]? -    []? Sulcus Sign []?+ / []? -   []? Apprehension []?+ / []? -   []? Bicep Load II []?+ / []? -   []? Elbow Valgus []?+ / []? -     []? Elbow Varus []?+ / []? -   []? Jordan's relocation []?+ / []? -   []? Empty Can []?+ / [x]? -  []? Drop arm []?+ / [x]? -  []? ER lag []?+ / []? -  []? Painful Arc [x]?+ / []? -  []? Charis Crespo []?+ / []? -   []? Belly Press/ lift off[]? + / []? -  []? Other: []?+ / []? -                   Comments:  Pt reports some tightness and pain in neck to top of R shoulder with Spurlings test and flexion/rotation test but it doesn't recreate pt's concordant symptom. CURRENT STATUS:  Observations: well nourished female     Inspection: normal orthopedic exam                              Range of Motion:     Neck:               Flexion:                       [x]? Normal   []? Limited               Extension:                   [x]? Normal   []? Limited                Right Rotation:            [x]? Normal   []? Limited               Left Rotation:               [x]? Normal   []? Limited               Right Side Bending:    [x]? Normal   []? Limited              Left Side Bending:      [x]? Normal   []?  Limited           Right Shoulder:  AROM: 170° Forward elevation, 90° ER,  IR to 50  PROM: 170° Forward elevation,  90° ER,  80° IR  Elbow WNL     Left Shoulder:  AROM: 170° Forward elevation,  85° ER,  IR to 65  PROM: 170° Forward elevation,  85° ER , 65° IR  Elbow WNL      Strength:                 Neck: WNL              R UE: 4+/5              L UE: 5-/5     Right Shoulder: Flexion  4+/5,  Abduction 4+/5, ER 4+/5, IR 4+/5       Left Shoulder: Flexion 5-/5,  Abduction 5-/5, ER 5-/5, IR 5-/5      Palpation: nontender to palpation.      Sensation: intact to light touch and temperature.     Special Tests:   []? Nerve Root Compression           Right []?+ / []? -    Left []?+ / []? -  []? Cervical Distraction []?+ / []? -     []? Spurling's Test           Right []?+ / [x]? -    Left []?+ / [x]? -     []? Flexion/rotation test :           Right []?+ / [x]? -    Left []?+ / [x]? -    []? Other: []?+ / []? -       Special Tests/Functional Screens:    []? Tello-Joshua []?+ / [x]? -  []? Worcester's []?+ / []? -   []? DANISH Ballard []?+ / []? -    []? [address] draw []?+ / []? -    []? Bicep Load []?+ / []? -   []? Crank []?+ / [x]? -  []? Evon Salk []?+ / []? -   []? [place] Glass []?+ / []? -  []? Neer's []?+ / []? -      []? Speed's []?+ / []? -   []? Kerwin's []?+ / [x]? -    []? Sulcus Sign []?+ / []? -   []? Apprehension []?+ / []? -   []? Bicep Load II []?+ / []? -   []? Elbow Valgus []?+ / []? -     []? Elbow Varus []?+ / []? -   []? Jordan's relocation []?+ / []? -   []? Empty Can []?+ / [x]? -  []? Drop arm []?+ / [x]? -  []? ER lag []?+ / []? -  []? Painful Arc []?+ / [x]? -  []? Johnanna Mercury []?+ / []? -   []? Belly Press/ lift off[]? + / [x]? -  []?  Other: []?+ / []? -                     Short Term goals (3 weeks)  · Decrease reported pain to 1/10 (not met)  · Increase ROM to neck by 10% and  R shoulder AROM: 170° Forward elevation,  80° ER,  IR to 50 (goal met)  · Increase Strength to 4/5 (goal met)  · Able to perform/complete the following functions/tasks: pt able to reach overhead 5x with minor

## 2020-04-10 RX ORDER — VALACYCLOVIR HYDROCHLORIDE 500 MG/1
500 TABLET, FILM COATED ORAL DAILY
Qty: 90 TABLET | Refills: 2 | Status: SHIPPED
Start: 2020-04-10 | End: 2020-11-24 | Stop reason: SDUPTHER

## 2020-04-10 RX ORDER — DULOXETIN HYDROCHLORIDE 60 MG/1
60 CAPSULE, DELAYED RELEASE ORAL DAILY
Qty: 90 CAPSULE | Refills: 2 | Status: SHIPPED
Start: 2020-04-10 | End: 2020-04-20

## 2020-04-16 RX ORDER — ACETAMINOPHEN 500 MG
500 TABLET ORAL EVERY 4 HOURS PRN
Qty: 120 TABLET | Refills: 2 | Status: SHIPPED | OUTPATIENT
Start: 2020-04-16

## 2020-04-20 RX ORDER — DULOXETIN HYDROCHLORIDE 60 MG/1
CAPSULE, DELAYED RELEASE ORAL
Qty: 90 CAPSULE | Refills: 2 | Status: SHIPPED
Start: 2020-04-20 | Stop reason: SDUPTHER

## 2020-05-05 ENCOUNTER — OFFICE VISIT (OUTPATIENT)
Dept: PRIMARY CARE CLINIC | Age: 65
End: 2020-05-05
Payer: COMMERCIAL

## 2020-05-05 VITALS
BODY MASS INDEX: 28.7 KG/M2 | HEIGHT: 61 IN | DIASTOLIC BLOOD PRESSURE: 76 MMHG | TEMPERATURE: 98 F | HEART RATE: 108 BPM | SYSTOLIC BLOOD PRESSURE: 118 MMHG | WEIGHT: 152 LBS | OXYGEN SATURATION: 97 % | RESPIRATION RATE: 16 BRPM

## 2020-05-05 PROBLEM — G47.09 OTHER INSOMNIA: Status: ACTIVE | Noted: 2020-05-05

## 2020-05-05 PROBLEM — M75.41 IMPINGEMENT SYNDROME OF RIGHT SHOULDER: Status: ACTIVE | Noted: 2020-05-05

## 2020-05-05 PROCEDURE — 3017F COLORECTAL CA SCREEN DOC REV: CPT | Performed by: FAMILY MEDICINE

## 2020-05-05 PROCEDURE — G8427 DOCREV CUR MEDS BY ELIG CLIN: HCPCS | Performed by: FAMILY MEDICINE

## 2020-05-05 PROCEDURE — G8419 CALC BMI OUT NRM PARAM NOF/U: HCPCS | Performed by: FAMILY MEDICINE

## 2020-05-05 PROCEDURE — 99213 OFFICE O/P EST LOW 20 MIN: CPT | Performed by: FAMILY MEDICINE

## 2020-05-05 PROCEDURE — 1036F TOBACCO NON-USER: CPT | Performed by: FAMILY MEDICINE

## 2020-05-05 PROCEDURE — 20610 DRAIN/INJ JOINT/BURSA W/O US: CPT | Performed by: FAMILY MEDICINE

## 2020-05-05 RX ORDER — ZOLPIDEM TARTRATE 10 MG/1
10 TABLET ORAL NIGHTLY PRN
Qty: 7 TABLET | Refills: 0 | Status: SHIPPED | OUTPATIENT
Start: 2020-05-05 | End: 2020-05-12

## 2020-05-05 NOTE — PROGRESS NOTES
Vitals:    05/05/20 1346   BP: 118/76   Pulse: 108   Resp: 16   Temp: 98 °F (36.7 °C)   SpO2: 97%   Weight: 152 lb (68.9 kg)   Height: 5' 1\" (1.549 m)       Exam:  Physical Exam  Eyes:      Extraocular Movements: Extraocular movements intact. Conjunctiva/sclera: Conjunctivae normal.   Cardiovascular:      Rate and Rhythm: Normal rate and regular rhythm. Pulses:           Radial pulses are 2+ on the right side and 2+ on the left side. Pulmonary:      Effort: Pulmonary effort is normal.      Breath sounds: Normal breath sounds. Musculoskeletal:      Right shoulder: She exhibits decreased range of motion, tenderness, crepitus, pain, spasm and decreased strength. She exhibits no bony tenderness, no swelling, no effusion and no deformity. Comments: Neer and Javed test positive. Neurological:      General: No focal deficit present. Mental Status: She is alert. Psychiatric:         Mood and Affect: Mood normal.         Judgment: Judgment normal.         Assessment and Plan:   Laureen was seen today for shoulder pain. Diagnoses and all orders for this visit:    Acute pain of right shoulder  -     XR SHOULDER RIGHT (MIN 2 VIEWS); Future  Concerns for impingement syndrome on patient's exam. X-ray was ordered. X-rays reviewed during her office appointment. Patient was found to have arthritic-like changes around her right shoulder. Patient has tried and completed physical therapy in the past with moderate improvement but with subsequent worsening. Patient has been taking Tylenol. It was decided that the patient would undergo a shoulder injection. Procedure as noted below. Patient tolerated the procedure well.     PRE-OP DIAGNOSIS: Right shoulder impingement syndrome and OA  POST-OP DIAGNOSIS: Same   PROCEDURE: joint injection  Performing Physician: Dr. Francy Tam     Dose:        x  1%        _  2%   Lidocaine      4  mL    1ml  Steroid 40mg/mL Triamcinolone acetonide

## 2020-11-24 ENCOUNTER — PATIENT MESSAGE (OUTPATIENT)
Dept: PRIMARY CARE CLINIC | Age: 65
End: 2020-11-24

## 2020-11-24 RX ORDER — VALACYCLOVIR HYDROCHLORIDE 500 MG/1
500 TABLET, FILM COATED ORAL DAILY
Qty: 90 TABLET | Refills: 2 | Status: SHIPPED
Start: 2020-11-24 | End: 2020-11-30 | Stop reason: SDUPTHER

## 2020-11-24 NOTE — TELEPHONE ENCOUNTER
From: Pooja Huynh  To: Hubertjess Alvarado MD  Sent: 11/24/2020 11:19 AM EST  Subject: Prescription Question    Dr. Bonnie Hanson. Could I please have a refill of the Valacyclovir sent to [address] [phone]?

## 2020-11-30 RX ORDER — VALACYCLOVIR HYDROCHLORIDE 500 MG/1
500 TABLET, FILM COATED ORAL DAILY
Qty: 90 TABLET | Refills: 2 | Status: SHIPPED | OUTPATIENT
Start: 2020-11-30

## 2021-04-30 DIAGNOSIS — F34.1 DYSTHYMIA: ICD-10-CM

## 2021-05-01 RX ORDER — DULOXETIN HYDROCHLORIDE 60 MG/1
CAPSULE, DELAYED RELEASE ORAL
Qty: 60 CAPSULE | Refills: 0 | Status: SHIPPED | OUTPATIENT
Start: 2021-05-01